# Patient Record
Sex: FEMALE | Race: WHITE
[De-identification: names, ages, dates, MRNs, and addresses within clinical notes are randomized per-mention and may not be internally consistent; named-entity substitution may affect disease eponyms.]

---

## 2020-10-08 PROBLEM — Z00.00 ENCOUNTER FOR PREVENTIVE HEALTH EXAMINATION: Status: ACTIVE | Noted: 2020-10-08

## 2020-10-22 ENCOUNTER — APPOINTMENT (OUTPATIENT)
Dept: THORACIC SURGERY | Facility: CLINIC | Age: 55
End: 2020-10-22
Payer: COMMERCIAL

## 2020-10-22 VITALS
BODY MASS INDEX: 25.61 KG/M2 | HEART RATE: 76 BPM | RESPIRATION RATE: 16 BRPM | WEIGHT: 150 LBS | SYSTOLIC BLOOD PRESSURE: 126 MMHG | HEIGHT: 64 IN | DIASTOLIC BLOOD PRESSURE: 78 MMHG | OXYGEN SATURATION: 97 %

## 2020-10-22 DIAGNOSIS — M47.819 SPONDYLOSIS W/OUT MYELOPATHY OR RADICULOPATHY, SITE UNSPECIFIED: ICD-10-CM

## 2020-10-22 DIAGNOSIS — Z80.1 FAMILY HISTORY OF MALIGNANT NEOPLASM OF TRACHEA, BRONCHUS AND LUNG: ICD-10-CM

## 2020-10-22 DIAGNOSIS — Z78.9 OTHER SPECIFIED HEALTH STATUS: ICD-10-CM

## 2020-10-22 DIAGNOSIS — M62.830 MUSCLE SPASM OF BACK: ICD-10-CM

## 2020-10-22 PROCEDURE — 99072 ADDL SUPL MATRL&STAF TM PHE: CPT

## 2020-10-22 PROCEDURE — 99245 OFF/OP CONSLTJ NEW/EST HI 55: CPT

## 2020-10-22 RX ORDER — IBUPROFEN 200 MG/1
TABLET, COATED ORAL
Refills: 0 | Status: ACTIVE | COMMUNITY

## 2020-10-22 NOTE — HISTORY OF PRESENT ILLNESS
[FreeTextEntry1] : Ms. CARDONA is a 55 year old female referred by Dr. Almonte who presents for consultation. Her past medical history includes cervical spine compression. \par \par \par Reports feeling numbness to her left fingers and left limb pain. She also experiencing occasional muscle spasms. She has been seen by Chiropractor, physical therapy and acupuncture for relief. this has been occurring over the past 6 months. She was evaluated by Neurology and Orthopedics and was referred for possible Thoracic Outlet Syndrome.

## 2020-10-22 NOTE — ASSESSMENT
[FreeTextEntry1] : We discussed the possibility of Neurogenic Thoracic Outlet Syndrome. Typically we would see this after sports related injury with impingement and compression of the arterial, nerve, venous systems at the thoracic outlet. \par Her condition could also be related to cervical spine degeneration as well. \par \par We discussed Robotic First Rib Resection for relief of Thoracic Outlet Syndrome. The procedure, hospital stay and recovery was discussed in detail. All risks, benefits, and alternatives discussed at length with patient including an overall <1% risk. All questions addressed.\par \par PLAN:\par - MRI for r/o Thoracic Outlet Syndrome (at [facility])\par - Return to care after imaging to discuss plan of care\par \par \par I, Aidan Turpin NP am scribing for and in the presence of Dr. Concepcion the following sections HISTORY OF PRESENT ILLNESS, PAST MEDICAL/FAMILY/SOCIAL HISTORY; REVIEW OF SYSTEMS; VITAL SIGNS; PHYSICAL EXAM; DISPOSITION.\par \par "I personally performed the services described in the documentation, reviewed the documentation recorded by the scribe in my presence and accurately and completely records my words and actions."\par

## 2020-10-22 NOTE — REVIEW OF SYSTEMS
[Limb Pain] : limb pain [Negative] : Heme/Lymph [Feeling Poorly] : not feeling poorly [Feeling Tired] : not feeling tired [FreeTextEntry9] : finger numbness

## 2020-10-22 NOTE — PHYSICAL EXAM
[General Appearance - In No Acute Distress] : in no acute distress [General Appearance - Well Nourished] : well nourished [General Appearance - Well Developed] : well developed [Sclera] : the sclera and conjunctiva were normal [Outer Ear] : the ears and nose were normal in appearance [Neck Appearance] : the appearance of the neck was normal [Jugular Venous Distention Increased] : there was no jugular-venous distention [Respiration, Rhythm And Depth] : normal respiratory rhythm and effort [Auscultation Breath Sounds / Voice Sounds] : lungs were clear to auscultation bilaterally [Heart Rate And Rhythm] : heart rate was normal and rhythm regular [Heart Sounds] : normal S1 and S2 [Examination Of The Chest] : the chest was normal in appearance [Chest Visual Inspection Thoracic Asymmetry] : no chest asymmetry [No Pulse Discrepancy] : no pulse discrepancy detected [Fingers] :  capillary refill of the fingers was normal [Abnormal Walk] : normal gait [Motor Tone] : muscle strength and tone were normal [Skin Color & Pigmentation] : normal skin color and pigmentation [Skin Lesions] : no skin lesions [Sensation] : the sensory exam was normal to light touch and pinprick [Motor Exam] : the motor exam was normal [Oriented To Time, Place, And Person] : oriented to person, place, and time [Impaired Insight] : insight and judgment were intact [Affect] : the affect was normal

## 2020-12-08 PROBLEM — M54.2 NECK PAIN ON LEFT SIDE: Status: ACTIVE | Noted: 2020-10-22

## 2020-12-08 NOTE — CONSULT LETTER
[Dear  ___] : Dear  [unfilled], [Courtesy Letter:] : I had the pleasure of seeing your patient, [unfilled], in my office today. [Please see my note below.] : Please see my note below. [Referral Closing:] : Thank you very much for seeing this patient.  If you have any questions, please do not hesitate to contact me. [Sincerely,] : Sincerely, [FreeTextEntry2] : Amaury Almonte MD\par [address] [facility]\[place] [address]\par P: [phone]\par F: [phone] [FreeTextEntry3] : Stuart Concepcion MD\par Director of Thoracic, [facility]\par Cardiovascular & Thoracic Surgery\par \par Cardiovascular & Thoracic Surgery\par [facility] [employer] [facility]\par \par [facility] \par [address] [place] [address]\[place] [address]\par [phone] Tel\par [phone] Fax\par

## 2020-12-08 NOTE — HISTORY OF PRESENT ILLNESS
[FreeTextEntry1] : Mrs. Granados is a 55 year old female here today for follow up MRI/MRA, thoracic outlet syndrome. Her past medical history includes cervical spine compression

## 2020-12-10 ENCOUNTER — APPOINTMENT (OUTPATIENT)
Dept: THORACIC SURGERY | Facility: CLINIC | Age: 55
End: 2020-12-10

## 2020-12-10 DIAGNOSIS — M54.2 CERVICALGIA: ICD-10-CM

## 2022-09-29 ENCOUNTER — APPOINTMENT (OUTPATIENT)
Dept: GASTROENTEROLOGY | Facility: CLINIC | Age: 57
End: 2022-09-29

## 2022-12-17 ENCOUNTER — NON-APPOINTMENT (OUTPATIENT)
Age: 57
End: 2022-12-17

## 2023-06-29 ENCOUNTER — APPOINTMENT (OUTPATIENT)
Dept: ORTHOPEDIC SURGERY | Facility: CLINIC | Age: 58
End: 2023-06-29
Payer: COMMERCIAL

## 2023-06-29 VITALS — WEIGHT: 150 LBS | HEIGHT: 64 IN | BODY MASS INDEX: 25.61 KG/M2

## 2023-06-29 PROCEDURE — 73030 X-RAY EXAM OF SHOULDER: CPT | Mod: RT

## 2023-06-29 PROCEDURE — 73070 X-RAY EXAM OF ELBOW: CPT | Mod: RT

## 2023-06-29 PROCEDURE — 99204 OFFICE O/P NEW MOD 45 MIN: CPT

## 2023-06-29 RX ORDER — DICLOFENAC SODIUM 1% 10 MG/G
1 GEL TOPICAL
Qty: 1 | Refills: 0 | Status: ACTIVE | COMMUNITY
Start: 2023-06-29 | End: 1900-01-01

## 2023-06-29 RX ORDER — MELOXICAM 15 MG/1
15 TABLET ORAL
Qty: 90 | Refills: 0 | Status: ACTIVE | COMMUNITY
Start: 2023-06-29 | End: 1900-01-01

## 2023-06-29 NOTE — DISCUSSION/SUMMARY
[de-identified] : (Assessment and Plan)\par \par History, clinical examination and imaging were most consistent with:\par -right shoulder rotator cuff partial tear and proximal biceps tendonitis\par -right elbow lateral epicondylitis\par \par The diagnosis was explained in detail. The potential non-surgical and surgical treatments were reviewed. The relative risks and benefits of each option were considered relative to the patient’s age, activity level, medical history, symptom severity and previously attempted treatments. \par \par The patient was advised to consult with their primary medical provider prior to initiation of any new medications to reduce the risk of adverse effects specific to their long-term home medications and medical history. The risk of gastrointestinal irritation and kidney injury specific to long-term NSAID use was discussed.   \par \par \par -Patient will proceed with formal PT.\par -Patient will begin bracing with counterforce strap and removable wrist brace for the elbow. \par -We discussed the prolonged natural history of her elbow condition.\par -Injection is discussed and defererd.\par -Meloxicam and voltaren gel as needed for pain. \par -The added clinical utility of an MRI was discussed. The patient deferred further diagnostic testing at this time.\par -Follow up in 6 weeks. If symptoms persist consider injection. \par \par \par (MDM) \par \par Problem Complexity\par -Moderate: chronic illness with exacerbation\par \par Risk\par -Moderate: prescription medication\par \par -Patient has not been seen by another provider in my practice within the past 2 years who specializes in orthopedic sports medicine, shoulder or elbow surgery. \par \par

## 2023-06-29 NOTE — HISTORY OF PRESENT ILLNESS
[de-identified] : (History of Present Illness)\par \par Patient age in years is 57\par Occupation is \par \par Body part causing symptoms is the rt shoulder and rt elbow\par Symptoms began 1 month ago, no injury, associated with working out\par Location of pain is anterior shoulder and lateral elbow\par Quality of pain is  dull\par Pain score at rest is 0\par Pain score during activity is 5\par Radicular symptoms are not present\par Prior treatments include advil, ice\par Patient's condition is not associated with worker’s compensation, no-fault or interscholastic athletics\par

## 2023-06-29 NOTE — IMAGING
[de-identified] : (Physical Exam: Shoulder)\par \par Laterality is right \par \par Patient is in no acute distress, alert and oriented\par Sensation is grossly intact to light touch in the hand\par Motor function is 5/5 in the hand\par Capillary refill is less than 2 seconds in the fingers\par Lymphadenopathy is not present\par Peripheral edema is not present\par \par Skin is intact \par Swelling is not present \par Atrophy is not present\par Scapular winging is not present\par Deformity of the AC joint is not present\par Deformity of the biceps is not present \par \par Bicipital groove tenderness is present \par AC joint tenderness is not present \par Scapulothoracic tenderness is not present \par Cervical paraspinal tenderness is not present \par \par Active forward elevation is 175\par Passive forward elevation is 175\par External rotation at the side is 80\par Internal rotation behind the back is to the level of T7 \par \par Forward elevation strength is 5/5\par External rotation strength at the side is 5/5 \par Internal rotation strength at the side is 5/5 \par Deltoid strength of anterior, posterior and lateral heads is 5/5\par \par Starks test is abnormal \par O’Allen’s test is abnormal\par Speed’s test is normal\par Cross body adduction test is normal\par Apprehension and relocation is normal\par Sulcus sign is normal\par Belly press test is normal\par Spurling’s test is normal\par \par (Physical Exam: Elbow)\par \par Laterality is right \par \par Skin is intact \par Olecranon bursa swelling is not present\par Biceps deformity is not present\par Intrinsic atrophy is not present\par \par Lateral epicondyle tenderness is present \par Medial epicondyle tenderness is not present \par Radial head tenderness is not present\par Biceps tenderness is not present \par Triceps tenderness is not present \par \par Extension is 0\par Flexion is 140\par Pronation is 80\par Supination is 80\par \par Flexion strength is 5/5\par Extension strength is 5/5 \par Pronation strength is 5/5 \par Supination strength is 5/5 \par \par Cozen's test is abnormal\par Biceps hook test is normal \par Tinel's test of ulnar nerve is normal\par Moving valgus stress test is normal\par \par  [Right] : right elbow [There are no fractures, subluxations or dislocations. No significant abnormalities are seen] : There are no fractures, subluxations or dislocations. No significant abnormalities are seen

## 2023-07-18 ENCOUNTER — TRANSCRIPTION ENCOUNTER (OUTPATIENT)
Age: 58
End: 2023-07-18

## 2023-08-24 ENCOUNTER — APPOINTMENT (OUTPATIENT)
Dept: ORTHOPEDIC SURGERY | Facility: CLINIC | Age: 58
End: 2023-08-24

## 2023-10-22 ENCOUNTER — NON-APPOINTMENT (OUTPATIENT)
Age: 58
End: 2023-10-22

## 2023-10-26 ENCOUNTER — APPOINTMENT (OUTPATIENT)
Dept: ORTHOPEDIC SURGERY | Facility: CLINIC | Age: 58
End: 2023-10-26
Payer: COMMERCIAL

## 2023-10-26 VITALS — HEIGHT: 64 IN | BODY MASS INDEX: 25.61 KG/M2 | WEIGHT: 150 LBS

## 2023-10-26 DIAGNOSIS — Z78.9 OTHER SPECIFIED HEALTH STATUS: ICD-10-CM

## 2023-10-26 DIAGNOSIS — G56.21 LESION OF ULNAR NERVE, RIGHT UPPER LIMB: ICD-10-CM

## 2023-10-26 DIAGNOSIS — Z86.2 PERSONAL HISTORY OF DISEASES OF THE BLOOD AND BLOOD-FORMING ORGANS AND CERTAIN DISORDERS INVOLVING THE IMMUNE MECHANISM: ICD-10-CM

## 2023-10-26 PROCEDURE — 99214 OFFICE O/P EST MOD 30 MIN: CPT | Mod: 25

## 2023-10-26 PROCEDURE — 20610 DRAIN/INJ JOINT/BURSA W/O US: CPT | Mod: RT

## 2023-10-31 ENCOUNTER — APPOINTMENT (OUTPATIENT)
Dept: ORTHOPEDIC SURGERY | Facility: CLINIC | Age: 58
End: 2023-10-31

## 2024-01-11 ENCOUNTER — APPOINTMENT (OUTPATIENT)
Dept: ORTHOPEDIC SURGERY | Facility: CLINIC | Age: 59
End: 2024-01-11

## 2024-03-07 ENCOUNTER — APPOINTMENT (OUTPATIENT)
Dept: ORTHOPEDIC SURGERY | Facility: CLINIC | Age: 59
End: 2024-03-07
Payer: COMMERCIAL

## 2024-03-07 VITALS — HEIGHT: 64 IN | BODY MASS INDEX: 25.61 KG/M2 | WEIGHT: 150 LBS

## 2024-03-07 DIAGNOSIS — M54.2 CERVICALGIA: ICD-10-CM

## 2024-03-07 PROCEDURE — 73010 X-RAY EXAM OF SHOULDER BLADE: CPT | Mod: RT

## 2024-03-07 PROCEDURE — 73030 X-RAY EXAM OF SHOULDER: CPT | Mod: RT

## 2024-03-07 PROCEDURE — 99214 OFFICE O/P EST MOD 30 MIN: CPT

## 2024-03-07 RX ORDER — OMEPRAZOLE 40 MG/1
CAPSULE, DELAYED RELEASE ORAL
Refills: 0 | Status: ACTIVE | COMMUNITY

## 2024-03-07 RX ORDER — DIAZEPAM 5 MG/1
5 TABLET ORAL
Qty: 1 | Refills: 0 | Status: ACTIVE | COMMUNITY
Start: 2024-03-07 | End: 1900-01-01

## 2024-03-08 NOTE — DATA REVIEWED
[MRI] : MRI [Right] : of the right [Report was reviewed and noted in the chart] : The report was reviewed and noted in the chart [Shoulder] : shoulder [I independently reviewed and interpreted images and report] : I independently reviewed and interpreted images and report [I reviewed the films/CD and additionally noted] : I reviewed the films/CD and additionally noted [FreeTextEntry1] : partial to near full-thickness supraspinatus tendon tear, superior third subscapularis tendon tear with subluxation of the biceps tendon.

## 2024-03-08 NOTE — IMAGING
[de-identified] : The patient is a well appearing 58 year  old female of their stated age.  Neck is supple & nontender to palpation. POSITIVE SPURLING'S ON THE RIGHT.    Effected Shoulder: RIGHT  Inspection:  Scapula Winging: Negative  Deformity: None  Erythema: None  Ecchymosis: None  Abrasions: None  Effusion: None   Range of Motion:  Active Forward Flexion: 170 degrees   Active Abduction: 170 degrees   Passive Forward Flexion: 170 degrees   Passive Abduction: 170 degrees   ER @ 90 degrees: 90 degrees  IR @ 90 degrees: 35 degrees  ER @ 0 degrees: 35 degrees  Motor Exam:  Forward Flexion: 4- out of 5  Flexion Plane of Scapula: 4- out of 5  Abduction: 4- out of 5  Internal Rotation: 4 out of 5  External Rotation: 4+ out of 5  Distal Motor Strength: 5 out of 5   Stability Testing:  Anterior: 1+  Posterior: 1+  Sulcus N: 1+  Sulcus ER: 1+  Provocative Tests:  Drop Arm: Negative  Impingement: POSITIVE  Essex: Negative  X-Arm Adduction: Negative  Belly Press: POSITIVE  Bear Hug: POSITIVE  Lift Off: Negative  Apprehension: Negative  Relocation: Negative  Posterior Load & Shift: Negative   Palpation:  AC Joint: Nontender  Clavicle: Nontender  SC Joint: Nontender  Bicepital Groove: Nontender  Coracoid Process: Nontender  Pectoralis Minor Tendon: Nontender  Pectoralis Major Tendon: Nontender & palpably intact  Latissimus Dorsi: Nontender   Proximal Humerus: Nontender  Scapula Body: Nontender  Medial Scapula Boarder: Nontender  Scapula Spine: Nontender   Neurologic Exam: Sensation to Light Touch:  Axillary: Grossly intact  Ulnar: Grossly intact  Radial: Grossly intact  Median: Grossly intact  Other:  N/A  Circulatory/Pulses:  Ulnar: 2+  Radial: 2+  Other Pertinent Findings: + Tinel's at the elbow   Contralateral Shoulder  Range of Motion:  Active Forward Flexion: 180 degrees   Active Abduction: 180 degrees   Passive Forward Flexion: 180 degrees   Passive Abduction: 180 degrees   ER @ 90 degrees: 90 degrees  IR @ 90 degrees: 45 degrees  ER @ 0 degrees: 50 degrees  Motor Exam:  Forward Flexion: 5 out of 5  Flexion Plane of Scapula: 5 out of 5  Abduction: 5 out of 5  Internal Rotation: 5 out of 5  External Rotation: 5 out of 5  Distal Motor Strength: 5 out of 5  Stability Testing:  Anterior: 1+  Posterior: 1+  Sulcus N: 1+  Sulcus ER: 1+  Other Pertinent Findings: None   Assessment: The patient is a 58 year old female with right shoulder pain and radiographic and physical exam findings consistent with rotator cuff tear and possible HNP.    The patients condition is acute  Documents/Results Reviewed Today: MRI right shoulder  Tests/Studies Independently Interpreted Today: MRI right shoulder reveals partial to near full-thickness supraspinatus tendon tear, superior third subscapularis tendon tear with subluxation of the biceps tendon.  Pertinent findings include: 170/170/90/35/35, 4-/5 ABD/FSP/FF, 4/5 IR, 4+/5 ER, +impingement, +bear hug, +belly press, +Spurlings, +Tinel's   Confounding medical conditions/concerns: None  Plan: Due to worsening pain and instability with radicular symptoms, patient will obtain MRI cervical spine to evaluate for disc herniation prior to proceeding with surgical intervention. Prescribed patient one Valium to take prior to obtaining MRI. Advised patient the risks involved with taking prescription medications - patient expresses understanding and reports they will be compliant. In the meantime, take OTC antiinflammatories as needed - use as directed. Modify activity as discussed.  Tests Ordered: MRI cervical spine  Prescription Medications Ordered: One Valium Discussed appropriate use of OTC anti-inflammatories and topical analgesics (including but not limited to Aleve, Advil, Tylenol, Motrin, Ibuprofen, Voltaren gel, etc.)  Braces/DME Ordered: None  Activity/Work/Sports Status: None  Additional Instructions: None Follow-Up: after MRI   The patient's current medication management of their orthopedic diagnosis was reviewed today: (1) We discussed a comprehensive treatment plan that included possible pharmaceutical management involving the use of prescription strength medications including but not limited to options such as oral Naprosyn 500mg BID, once daily Meloxicam 15 mg, or 500-650 mg Tylenol versus over the counter oral medications and topical prescription NSAID Pennsaid vs over the counter Voltaren gel.  Based on our extensive discussion, the patient declined prescription medication and will use over the counter Advil, Alleve, Voltaren Gel or Tylenol as directed. (2) There is a moderate risk of morbidity with further treatment, especially from use of prescription strength medications and possible side effects of these medications which include upset stomach with oral medications, skin reactions to topical medications and cardiac/renal issues with long term use. (3) I recommended that the patient follow-up with their medical physician to discuss any significant specific potential issues with long term medication use such as interactions with current medications or with exacerbation of underlying medical comorbidities. (4) The benefits and risks associated with use of injectable, oral or topical, prescription and over the counter anti-inflammatory medications were discussed with the patient. The patient voiced understanding of the risks including but not limited to bleeding, stroke, kidney dysfunction, heart disease, and were referred to the black box warning label for further information.  I, Evon Arenas, attest that this documentation has been prepared under the direction and in the presence of Provider Dr. Nish Moody.   The documentation recorded by the scribe accurately reflects the services IDr. Nish, personally performed and the decisions made by me.

## 2024-03-08 NOTE — HISTORY OF PRESENT ILLNESS
[de-identified] : The patient is a 58 year  old right hand dominant female who presents today complaining of right shoulder pain.   Date of Injury/Onset: 3/1/23 Pain:    At Rest: 2/10 10/10 - sleeping With Activity:  6/10  Mechanism of injury: gradual onset, no MARCOS This is NOT a Work Related Injury being treated under Worker's Compensation. This is NOT an athletic injury occurring associated with an interscholastic or organized sports team. Quality of symptoms: aching, sharp, stabbing, numbness and tingling into pinky and ring finger Improves with: rest, ice Worse with: sleeping, turning over in bed, external rotation, lifting with internal rotation Prior treatment: Dr. Chong, PT, chiropractor, acupuncture, massage therapy Prior Imaging: xray, MRI Out of work/sport: currently working School/Sport/Position/Occupation:   Additional Information: Fell in the shower due to a vertigo episode on 2/20 and landed on her right shoulder and her pain has been worse since the fall.

## 2024-03-09 ENCOUNTER — RESULT REVIEW (OUTPATIENT)
Age: 59
End: 2024-03-09

## 2024-03-14 ENCOUNTER — APPOINTMENT (OUTPATIENT)
Dept: ORTHOPEDIC SURGERY | Facility: CLINIC | Age: 59
End: 2024-03-14
Payer: COMMERCIAL

## 2024-03-14 VITALS — WEIGHT: 150 LBS | BODY MASS INDEX: 25.61 KG/M2 | HEIGHT: 64 IN

## 2024-03-14 DIAGNOSIS — M50.20 OTHER CERVICAL DISC DISPLACEMENT, UNSPECIFIED CERVICAL REGION: ICD-10-CM

## 2024-03-14 PROCEDURE — 99214 OFFICE O/P EST MOD 30 MIN: CPT | Mod: 25

## 2024-03-14 PROCEDURE — L3670: CPT | Mod: RT

## 2024-03-14 NOTE — HISTORY OF PRESENT ILLNESS
[de-identified] : The patient is a 58 year  old right hand dominant female who presents today complaining of right shoulder pain.   Date of Injury/Onset: 3/1/23 Re-injury: 2/20/24 Pain:    At Rest: 2/10 10/10 - sleeping With Activity:  6/10  Mechanism of injury: gradual onset, no MARCOS This is NOT a Work Related Injury being treated under Worker's Compensation. This is NOT an athletic injury occurring associated with an interscholastic or organized sports team. Quality of symptoms: aching, sharp, stabbing, numbness and tingling into pinky and ring finger Improves with: rest, ice Worse with: sleeping, turning over in bed, external rotation, lifting with internal rotation Treatment/Imaging/Studies Since Last Visit: MRI 	Reports Available For Review Today: MRI report Out of work/sport: currently working School/Sport/Position/Occupation:   Changes since last visit: patient reports that she is feeling a little better since modifying her activity. Here to review MRI results Additional Information: none

## 2024-03-14 NOTE — DATA REVIEWED
[Cervical Spine] : cervical spine [MRI] : MRI [Right] : of the right [Shoulder] : shoulder [Report was reviewed and noted in the chart] : The report was reviewed and noted in the chart [I independently reviewed and interpreted images and report] : I independently reviewed and interpreted images and report [I reviewed the films/CD and additionally noted] : I reviewed the films/CD and additionally noted [FreeTextEntry1] : C6-C7 disc herniation with left>right foraminal stenosis, C5-C6 disc herniation with left sided foraminal stenosis, straightening of the normal lordotic curve consistent with spasms. [FreeTextEntry2] :  partial to near full-thickness supraspinatus tendon tear, superior third subscapularis tendon tear with subluxation of the biceps tendon.

## 2024-03-14 NOTE — IMAGING
[de-identified] : The patient is a well appearing 58 year  old female of their stated age.  Neck is supple & nontender to palpation. POSITIVE SPURLING'S ON THE RIGHT.    Effected Shoulder: RIGHT  Inspection:  Scapula Winging: Negative  Deformity: None  Erythema: None  Ecchymosis: None  Abrasions: None  Effusion: None   Range of Motion:  Active Forward Flexion: 170 degrees   Active Abduction: 170 degrees   Passive Forward Flexion: 170 degrees   Passive Abduction: 170 degrees   ER @ 90 degrees: 90 degrees  IR @ 90 degrees: 35 degrees  ER @ 0 degrees: 35 degrees  Motor Exam:  Forward Flexion: 4- out of 5  Flexion Plane of Scapula: 4- out of 5  Abduction: 4- out of 5  Internal Rotation: 4 out of 5  External Rotation: 4+ out of 5  Distal Motor Strength: 5 out of 5   Stability Testing:  Anterior: 1+  Posterior: 1+  Sulcus N: 1+  Sulcus ER: 1+  Provocative Tests:  Drop Arm: Negative  Impingement: POSITIVE  [place]: Negative  X-Arm Adduction: Negative  Belly Press: POSITIVE  Bear Hug: POSITIVE  Lift Off: Negative  Apprehension: Negative  Relocation: Negative  Posterior Load & Shift: Negative   Palpation:  AC Joint: Nontender  Clavicle: Nontender  SC Joint: Nontender  Bicepital Groove: Nontender  Coracoid Process: Nontender  Pectoralis Minor Tendon: Nontender  Pectoralis Major Tendon: Nontender & palpably intact  Latissimus Dorsi: Nontender   Proximal Humerus: Nontender  Scapula Body: Nontender  Medial Scapula Boarder: Nontender  Scapula Spine: Nontender   Neurologic Exam: Sensation to Light Touch:  Axillary: Grossly intact  Ulnar: Grossly intact  Radial: Grossly intact  Median: Grossly intact  Other:  N/A  Circulatory/Pulses:  Ulnar: 2+  Radial: 2+  Other Pertinent Findings: + Tinel's at the elbow   Contralateral Shoulder  Range of Motion:  Active Forward Flexion: 180 degrees   Active Abduction: 180 degrees   Passive Forward Flexion: 180 degrees   Passive Abduction: 180 degrees   ER @ 90 degrees: 90 degrees  IR @ 90 degrees: 45 degrees  ER @ 0 degrees: 50 degrees  Motor Exam:  Forward Flexion: 5 out of 5  Flexion Plane of Scapula: 5 out of 5  Abduction: 5 out of 5  Internal Rotation: 5 out of 5  External Rotation: 5 out of 5  Distal Motor Strength: 5 out of 5  Stability Testing:  Anterior: 1+  Posterior: 1+  Sulcus N: 1+  Sulcus ER: 1+  Other Pertinent Findings: None   Assessment: The patient is a 58 year old female with right shoulder pain and radiographic and physical exam findings consistent with rotator cuff tear and cervical HNP.    The patients condition is acute  Documents/Results Reviewed Today: MRI cervical spine and Re-reviewed MRI right shoulder  Tests/Studies Independently Interpreted Today: MRI cervical spine reveals C6-C7 disc herniation with left>right foraminal stenosis, C5-C6 disc herniation with left sided foraminal stenosis, straightening of the normal lordotic curve consistent with spasms. MRI right shoulder reveals partial to near full-thickness supraspinatus tendon tear, superior third subscapularis tendon tear with subluxation of the biceps tendon. Pertinent findings include: 170/170/90/35/35, 4-/5 ABD/FSP/FF, 4/5 IR, 4+/5 ER, +impingement, +bear hug, +belly press, +Spurlings, +Tinel's   Confounding medical conditions/concerns: None  Plan: Discussed non-operative and operative treatment options including right shoulder arthroscopic rotator cuff repair, biceps tenotomy versus tenodesis, subacromial decompression, acromioplasty, and any indicated procedures. All questions and concerns regarding the surgery were addressed. Went over the recovery timeline and expected outcomes following surgery. Patient elected to move forward with the surgical procedure. In the meantime, referred patient to pain management specialists for further evaluation and discussion of treatment options of her cervical spine.  Tests Ordered: Pre-op  Prescription Medications Ordered: One Valium Discussed appropriate use of OTC anti-inflammatories and topical analgesics (including but not limited to Aleve, Advil, Tylenol, Motrin, Ibuprofen, Voltaren gel, etc.)  Braces/DME Ordered: Ultrasling and cold therapy unit  Activity/Work/Sports Status: None  Additional Instructions: None Follow-Up: 2 weeks post-op   The patient's current medication management of their orthopedic diagnosis was reviewed today: (1) We discussed a comprehensive treatment plan that included possible pharmaceutical management involving the use of prescription strength medications including but not limited to options such as oral Naprosyn 500mg BID, once daily Meloxicam 15 mg, or 500-650 mg Tylenol versus over the counter oral medications and topical prescription NSAID Pennsaid vs over the counter Voltaren gel.  Based on our extensive discussion, the patient declined prescription medication and will use over the counter Advil, Alleve, Voltaren Gel or Tylenol as directed. (2) There is a moderate risk of morbidity with further treatment, especially from use of prescription strength medications and possible side effects of these medications which include upset stomach with oral medications, skin reactions to topical medications and cardiac/renal issues with long term use. (3) I recommended that the patient follow-up with their medical physician to discuss any significant specific potential issues with long term medication use such as interactions with current medications or with exacerbation of underlying medical comorbidities. (4) The benefits and risks associated with use of injectable, oral or topical, prescription and over the counter anti-inflammatory medications were discussed with the patient. The patient voiced understanding of the risks including but not limited to bleeding, stroke, kidney dysfunction, heart disease, and were referred to the black box warning label for further information.  Consent:  Conservative treatment, nontreatment, nonsurgical intervention and surgical intervention treatment options have been reviewed with the patient.  The patient continues to be symptomatic and has failed conservative treatment, and elects to move forward with surgical intervention.  The patient is indicated for right shoulder arthroscopic rotator cuff repair, biceps tenotomy vs tenodesis, subacromial decompression, acromioplasty and all indicated procedures. As such the alternatives, benefits and risks, of the above procedure, including but not limited to bleeding, infection, neurovascular injury, loss of limb, loss of life,  DVT, PE, RSD, inability to return to previous level of activity, inability to return to previous level of employment, advancement of or to osteoarthritic changes, joint instability or motion loss, hardware failure or migration, (biceps pain cramping weakness or deformity,) failure to resolve all symptoms, failure to return to sports and need for further procedures, as well as specific risk of need for future joint arthroplasty were discussed with the patient and/or their legal guardian who agreed to move forward with surgical intervention.  They have reviewed and signed the consent form today after expressing understanding of the above documented conversation. The patient or their representative will contact my office as instructed on the preoperative instruction sheet they received today to schedule surgery in a timely manner as discussed. Over 25 minutes were spent on this encounter including time with the patient and over 15 minutes spent on counseling and coordination of care.   I, Evon Arenas, attest that this documentation has been prepared under the direction and in the presence of Provider Dr. Nish Moody.  The documentation recorded by the scribe accurately reflects the services I, Dr. Nish Moody, personally performed and the decisions made by me.

## 2024-04-15 ENCOUNTER — APPOINTMENT (OUTPATIENT)
Dept: ORTHOPEDIC SURGERY | Facility: CLINIC | Age: 59
End: 2024-04-15
Payer: COMMERCIAL

## 2024-04-15 VITALS — HEIGHT: 64 IN | BODY MASS INDEX: 25.61 KG/M2 | WEIGHT: 150 LBS

## 2024-04-15 PROCEDURE — 99204 OFFICE O/P NEW MOD 45 MIN: CPT

## 2024-04-17 NOTE — PHYSICAL EXAM
[de-identified] : General: Well appearing, no acute distress Neurologic: A&Ox3, No focal deficits Head: NCAT without abrasions, lacerations, or ecchymosis to head, face, or scalp Eyes: No scleral icterus, no gross abnormalities Respiratory: Equal chest wall expansion bilaterally, no accessory muscle use Lymphatic: No lymphadenopathy palpated Skin: Warm and dry Psychiatric: Normal mood and affect   Right Shoulder:  Inspection/Palpation: biceps groove tenderness  Range of Motion: no crepitus; FF 0-150 with pain; ER at side 45; IR to T7  Strength: forward elevation in scapular plane [5]/5, internal rotation [5]/5, external rotation [5]/5, adduction [5]/5 and abduction [5]/5  Stability: load and shift test negative, apprehension test negative, relocation test negative, sulcus sign negative, Gretchen test negative, Jerk test negative  Tests: Starks negative, Neer's test's test negative, drop arm test negative, bear hug test negative, [place] sign negative, cross arm adduction negative, lift off sign negative, Hornblower's sign negative, speeds test negative, Yergason's test negative, bicipital groove tenderness POS, Templeton's Active Compression test negative, whipple test negative, bicep's load II test negative [de-identified] : MRI Right Shoulder JaquelineErwin  CEBALLOS: 10/19/23  IMPRESSION:  Suprapinatus tendinosis with intrasubstance delaminating tearing at its insertion.   Mild fraying/ truncation at the free edge posterior superior labrum.   Mild degenerative changes of the acromioclavicular joint.   Evaluation limited by early termination at the patient's request.

## 2024-04-17 NOTE — DISCUSSION/SUMMARY
[de-identified] : We had a thorough discussion regarding the nature of her pain, the pathophysiology, as well as all treatment options. Based on clinical exam, MRI and radiograph findings they have low-grade partial tearing of the right rotator cuff. The patient came to me for a third opinion. I discussed operative and non-operative treatment modalities and the benefits of both. The patient would like to consider non-operative care (although it is incomplete). She could also consider a repeat cortisone injection in the future, although the prior injection gave her minimal relief. I believe she has failed greater than 6mo of conservative care and is indicated for surgery at this time. She would like to think about the procedure further. She will f/u with Dr. Moody for further discussion. All questions were answered and the patient verbalized understanding. The patient is in agreement with this treatment plan.

## 2024-04-17 NOTE — CONSULT LETTER
[Dear  ___] : Dear  [unfilled], [Consult Letter:] : I had the pleasure of evaluating your patient, [unfilled]. [Please see my note below.] : Please see my note below. [Sincerely,] : Sincerely, [FreeTextEntry3] : Dr. Kenton Mcintosh

## 2024-04-17 NOTE — HISTORY OF PRESENT ILLNESS
[de-identified] : SHAYLEE CARDONA is a 58 year female being seen for initial visit R shoulder pain. She has been under the care of Dr. Chong and Dr. Moody.  Dr. Chong ordered an MRI of her shoulder dictated in imaging. On 10/26/2023 she received a right shoulder subacromial cortisone injection for right shoulder RTC tendinitis/tear. She then saw Dr. Moody on 3/7/2024 who ordered a cervical spine MRI to rule out cervical disc pathology. She returned to see Dr. Moody on 3/14/2024 who scheduled her for surgery. She reports pain with PT, which she attended for about a month.

## 2024-04-17 NOTE — ADDENDUM
[FreeTextEntry1] : Documented by Kiersten Raymond acting as a scribe for Dr. Mcintosh on 04/15/2024. All medical record entries made by the Scribe were at my, Dr. Mcintosh's, direction and personally dictated by me on 04/15/2024. I have reviewed the chart and agree that the record accurately reflects my personal performance of the history, physical exam, procedure and imaging.

## 2024-05-02 ENCOUNTER — APPOINTMENT (OUTPATIENT)
Dept: ORTHOPEDIC SURGERY | Facility: CLINIC | Age: 59
End: 2024-05-02
Payer: COMMERCIAL

## 2024-05-02 VITALS — WEIGHT: 150 LBS | BODY MASS INDEX: 25.61 KG/M2 | HEIGHT: 64 IN

## 2024-05-02 DIAGNOSIS — M75.111 INCOMPLETE ROTATOR CUFF TEAR OR RUPTURE OF RIGHT SHOULDER, NOT SPECIFIED AS TRAUMATIC: ICD-10-CM

## 2024-05-02 DIAGNOSIS — M54.12 RADICULOPATHY, CERVICAL REGION: ICD-10-CM

## 2024-05-02 DIAGNOSIS — M75.21 BICIPITAL TENDINITIS, RIGHT SHOULDER: ICD-10-CM

## 2024-05-02 DIAGNOSIS — M25.511 PAIN IN RIGHT SHOULDER: ICD-10-CM

## 2024-05-02 DIAGNOSIS — S46.811A STRAIN OF OTHER MUSCLES, FASCIA AND TENDONS AT SHOULDER AND UPPER ARM LEVEL, RIGHT ARM, INITIAL ENCOUNTER: ICD-10-CM

## 2024-05-02 DIAGNOSIS — M75.101 UNSPECIFIED ROTATOR CUFF TEAR OR RUPTURE OF RIGHT SHOULDER, NOT SPECIFIED AS TRAUMATIC: ICD-10-CM

## 2024-05-02 PROCEDURE — 99213 OFFICE O/P EST LOW 20 MIN: CPT

## 2024-05-03 NOTE — HISTORY OF PRESENT ILLNESS
[de-identified] : The patient is a 58 year  old right hand dominant female who presents today complaining of right shoulder pain.   Date of Injury/Onset: 3/1/23 Re-injury: 2/20/24 Pain:    At Rest: 3/10 - sleeping With Activity:  6/10  Mechanism of injury: gradual onset, no MARCOS This is NOT a Work Related Injury being treated under Worker's Compensation. This is NOT an athletic injury occurring associated with an interscholastic or organized sports team. Quality of symptoms: aching, sharp, stabbing, numbness and tingling into pinky and ring finger Improves with: rest, ice Worse with: sleeping, turning over in bed, external rotation, lifting with internal rotation Treatment/Imaging/Studies Since Last Visit: HEP/working out  	Reports Available For Review Today: None new  Out of work/sport: currently working School/Sport/Position/Occupation:   Changes since last visit: Patient is doing a HEP  Additional Information: Patient was indicated for a right shoulder RCR, but cancelled it because she is not ready to undergo surgery yet

## 2024-05-03 NOTE — IMAGING
[de-identified] : The patient is a well appearing 58 year  old female of their stated age.  Neck is supple & nontender to palpation. POSITIVE SPURLING'S ON THE RIGHT.    Effected Shoulder: RIGHT  Inspection:  Scapula Winging: Negative  Deformity: None  Erythema: None  Ecchymosis: None  Abrasions: None  Effusion: None   Range of Motion:  Active Forward Flexion: 170 degrees   Active Abduction: 170 degrees   Passive Forward Flexion: 170 degrees   Passive Abduction: 170 degrees   ER @ 90 degrees: 90 degrees  IR @ 90 degrees: 35 degrees  ER @ 0 degrees: 35 degrees  Motor Exam:  Forward Flexion: 4- out of 5  Flexion Plane of Scapula: 4- out of 5  Abduction: 4- out of 5  Internal Rotation: 4 out of 5  External Rotation: 4+ out of 5  Distal Motor Strength: 5 out of 5   Stability Testing:  Anterior: 1+  Posterior: 1+  Sulcus N: 1+  Sulcus ER: 1+  Provocative Tests:  Drop Arm: Negative  Impingement: POSITIVE  [place]: Negative  X-Arm Adduction: Negative  Belly Press: POSITIVE  Bear Hug: POSITIVE  Lift Off: Negative  Apprehension: Negative  Relocation: Negative  Posterior Load & Shift: Negative   Palpation:  AC Joint: Nontender  Clavicle: Nontender  SC Joint: Nontender  Bicepital Groove: Nontender  Coracoid Process: Nontender  Pectoralis Minor Tendon: Nontender  Pectoralis Major Tendon: Nontender & palpably intact  Latissimus Dorsi: Nontender   Proximal Humerus: Nontender  Scapula Body: Nontender  Medial Scapula Boarder: Nontender  Scapula Spine: Nontender   Neurologic Exam: Sensation to Light Touch:  Axillary: Grossly intact  Ulnar: Grossly intact  Radial: Grossly intact  Median: Grossly intact  Other:  N/A  Circulatory/Pulses:  Ulnar: 2+  Radial: 2+  Other Pertinent Findings: + Tinel's at the elbow   Contralateral Shoulder  Range of Motion:  Active Forward Flexion: 180 degrees   Active Abduction: 180 degrees   Passive Forward Flexion: 180 degrees   Passive Abduction: 180 degrees   ER @ 90 degrees: 90 degrees  IR @ 90 degrees: 45 degrees  ER @ 0 degrees: 50 degrees  Motor Exam:  Forward Flexion: 5 out of 5  Flexion Plane of Scapula: 5 out of 5  Abduction: 5 out of 5  Internal Rotation: 5 out of 5  External Rotation: 5 out of 5  Distal Motor Strength: 5 out of 5  Stability Testing:  Anterior: 1+  Posterior: 1+  Sulcus N: 1+  Sulcus ER: 1+  Other Pertinent Findings: None   Assessment: The patient is a 58 year old female with right shoulder pain and radiographic and physical exam findings consistent with rotator cuff tear and cervical HNP.    The patients condition is acute  Documents/Results Reviewed Today: None   Tests/Studies Independently Interpreted Today: None  Pertinent findings include: 170/170/90/35/35, 4-/5 ABD/FSP/FF, 4/5 IR, 4+/5 ER, +impingement, +bear hug, +belly press, +Spurlings, +Tinel's   Confounding medical conditions/concerns: None  Plan: The patent has deferred surgical management secondary to personal life complications. Again, we discussed non-operative and operative treatment options including right shoulder arthroscopic rotator cuff repair, biceps tenotomy versus tenodesis, subacromial decompression, acromioplasty, and any indicated procedures. All questions and concerns regarding the surgery were addressed. Went over the recovery timeline and expected outcomes following surgery. Patient elected to move forward with the surgical procedure. She will call for surgical scheduling .  Tests Ordered: Pre-op  Prescription Medications Ordered: One Valium Discussed appropriate use of OTC anti-inflammatories and topical analgesics (including but not limited to Aleve, Advil, Tylenol, Motrin, Ibuprofen, Voltaren gel, etc.)  Braces/DME Ordered: Ultrasling and cold therapy unit  Activity/Work/Sports Status: None  Additional Instructions: None Follow-Up: 2 weeks post-op   The patient's current medication management of their orthopedic diagnosis was reviewed today: (1) We discussed a comprehensive treatment plan that included possible pharmaceutical management involving the use of prescription strength medications including but not limited to options such as oral Naprosyn 500mg BID, once daily Meloxicam 15 mg, or 500-650 mg Tylenol versus over the counter oral medications and topical prescription NSAID Pennsaid vs over the counter Voltaren gel.  Based on our extensive discussion, the patient declined prescription medication and will use over the counter Advil, Alleve, Voltaren Gel or Tylenol as directed. (2) There is a moderate risk of morbidity with further treatment, especially from use of prescription strength medications and possible side effects of these medications which include upset stomach with oral medications, skin reactions to topical medications and cardiac/renal issues with long term use. (3) I recommended that the patient follow-up with their medical physician to discuss any significant specific potential issues with long term medication use such as interactions with current medications or with exacerbation of underlying medical comorbidities. (4) The benefits and risks associated with use of injectable, oral or topical, prescription and over the counter anti-inflammatory medications were discussed with the patient. The patient voiced understanding of the risks including but not limited to bleeding, stroke, kidney dysfunction, heart disease, and were referred to the black box warning label for further information.  Consent:  Conservative treatment, nontreatment, nonsurgical intervention and surgical intervention treatment options have been reviewed with the patient.  The patient continues to be symptomatic and has failed conservative treatment, and elects to move forward with surgical intervention.  The patient is indicated for right shoulder arthroscopic rotator cuff repair, biceps tenotomy vs tenodesis, subacromial decompression, acromioplasty and all indicated procedures. As such the alternatives, benefits and risks, of the above procedure, including but not limited to bleeding, infection, neurovascular injury, loss of limb, loss of life,  DVT, PE, RSD, inability to return to previous level of activity, inability to return to previous level of employment, advancement of or to osteoarthritic changes, joint instability or motion loss, hardware failure or migration, (biceps pain cramping weakness or deformity,) failure to resolve all symptoms, failure to return to sports and need for further procedures, as well as specific risk of need for future joint arthroplasty were discussed with the patient and/or their legal guardian who agreed to move forward with surgical intervention.  They have reviewed and signed the consent form today after expressing understanding of the above documented conversation. The patient or their representative will contact my office as instructed on the preoperative instruction sheet they received today to schedule surgery in a timely manner as discussed. Over 25 minutes were spent on this encounter including time with the patient and over 15 minutes spent on counseling and coordination of care.  Joselyn MCNEIL attest that this documentation has been prepared under the direction and in the presence of Provider Dr. Nish Moody.   The documentation recorded by the scribe accurately reflects the service EWA Siddiqi PA-C personally performed and the decisions made by me. The patient was seen by me under the direct supervision of Dr. Nish Moody

## 2024-05-07 ENCOUNTER — APPOINTMENT (OUTPATIENT)
Dept: ORTHOPEDIC SURGERY | Facility: AMBULATORY SURGERY CENTER | Age: 59
End: 2024-05-07

## 2024-05-21 ENCOUNTER — APPOINTMENT (OUTPATIENT)
Dept: ORTHOPEDIC SURGERY | Facility: CLINIC | Age: 59
End: 2024-05-21

## 2024-12-10 ENCOUNTER — APPOINTMENT (OUTPATIENT)
Dept: OBGYN | Facility: CLINIC | Age: 59
End: 2024-12-10

## 2025-03-13 ENCOUNTER — APPOINTMENT (OUTPATIENT)
Dept: OBGYN | Facility: CLINIC | Age: 60
End: 2025-03-13
Payer: COMMERCIAL

## 2025-03-13 VITALS
DIASTOLIC BLOOD PRESSURE: 72 MMHG | HEIGHT: 64 IN | BODY MASS INDEX: 29.02 KG/M2 | WEIGHT: 170 LBS | SYSTOLIC BLOOD PRESSURE: 121 MMHG

## 2025-03-13 DIAGNOSIS — Z01.419 ENCOUNTER FOR GYNECOLOGICAL EXAMINATION (GENERAL) (ROUTINE) W/OUT ABNORMAL FINDINGS: ICD-10-CM

## 2025-03-13 DIAGNOSIS — R10.2 PELVIC AND PERINEAL PAIN: ICD-10-CM

## 2025-03-13 DIAGNOSIS — Z12.39 ENCOUNTER FOR OTHER SCREENING FOR MALIGNANT NEOPLASM OF BREAST: ICD-10-CM

## 2025-03-13 DIAGNOSIS — N95.1 MENOPAUSAL AND FEMALE CLIMACTERIC STATES: ICD-10-CM

## 2025-03-13 DIAGNOSIS — Z00.00 ENCOUNTER FOR GENERAL ADULT MEDICAL EXAMINATION W/OUT ABNORMAL FINDINGS: ICD-10-CM

## 2025-03-13 DIAGNOSIS — Z13.820 ENCOUNTER FOR SCREENING FOR OSTEOPOROSIS: ICD-10-CM

## 2025-03-13 PROCEDURE — 99214 OFFICE O/P EST MOD 30 MIN: CPT | Mod: 25

## 2025-03-13 PROCEDURE — 99386 PREV VISIT NEW AGE 40-64: CPT

## 2025-03-13 RX ORDER — PROGESTERONE 200 MG/1
200 CAPSULE ORAL
Qty: 28 | Refills: 6 | Status: ACTIVE | COMMUNITY
Start: 2025-03-13 | End: 1900-01-01

## 2025-03-13 RX ORDER — ESTRADIOL 0.1 MG/D
0.1 PATCH, EXTENDED RELEASE TRANSDERMAL
Qty: 8 | Refills: 6 | Status: ACTIVE | COMMUNITY
Start: 2025-03-13 | End: 1900-01-01

## 2025-03-17 LAB — HPV HIGH+LOW RISK DNA PNL CVX: NOT DETECTED

## 2025-03-20 LAB — CYTOLOGY CVX/VAG DOC THIN PREP: NORMAL

## 2025-06-04 ENCOUNTER — APPOINTMENT (OUTPATIENT)
Dept: OBGYN | Facility: CLINIC | Age: 60
End: 2025-06-04
Payer: COMMERCIAL

## 2025-06-04 VITALS
SYSTOLIC BLOOD PRESSURE: 109 MMHG | BODY MASS INDEX: 27.83 KG/M2 | HEIGHT: 64 IN | WEIGHT: 163 LBS | DIASTOLIC BLOOD PRESSURE: 73 MMHG

## 2025-06-04 DIAGNOSIS — R10.2 PELVIC AND PERINEAL PAIN: ICD-10-CM

## 2025-06-04 PROCEDURE — 99213 OFFICE O/P EST LOW 20 MIN: CPT

## 2025-06-04 RX ORDER — MISOPROSTOL 200 UG/1
200 TABLET ORAL
Qty: 2 | Refills: 0 | Status: ACTIVE | COMMUNITY
Start: 2025-06-04 | End: 1900-01-01

## 2025-08-18 ENCOUNTER — APPOINTMENT (OUTPATIENT)
Dept: OBGYN | Facility: CLINIC | Age: 60
End: 2025-08-18
Payer: COMMERCIAL

## 2025-08-18 VITALS
WEIGHT: 160 LBS | SYSTOLIC BLOOD PRESSURE: 106 MMHG | HEIGHT: 65 IN | BODY MASS INDEX: 26.66 KG/M2 | DIASTOLIC BLOOD PRESSURE: 70 MMHG

## 2025-08-18 DIAGNOSIS — R93.89 ABNORMAL FINDINGS ON DIAGNOSTIC IMAGING OF OTHER SPECIFIED BODY STRUCTURES: ICD-10-CM

## 2025-08-18 LAB
HCG UR QL: NEGATIVE
QUALITY CONTROL: YES

## 2025-08-18 PROCEDURE — 58558Z: CUSTOM

## 2025-08-20 ENCOUNTER — OFFICE (OUTPATIENT)
Dept: URBAN - METROPOLITAN AREA CLINIC 113 | Facility: CLINIC | Age: 60
Setting detail: OPHTHALMOLOGY
End: 2025-08-20
Payer: COMMERCIAL

## 2025-08-20 DIAGNOSIS — H01.005: ICD-10-CM

## 2025-08-20 DIAGNOSIS — H01.002: ICD-10-CM

## 2025-08-20 DIAGNOSIS — B30.9: ICD-10-CM

## 2025-08-20 DIAGNOSIS — H01.001: ICD-10-CM

## 2025-08-20 DIAGNOSIS — H01.004: ICD-10-CM

## 2025-08-20 DIAGNOSIS — B88.0: ICD-10-CM

## 2025-08-20 PROBLEM — H25.13 CATARACT SENILE NUCLEAR SCLEROSIS; BOTH EYES: Status: ACTIVE | Noted: 2025-08-20

## 2025-08-20 PROCEDURE — 99203 OFFICE O/P NEW LOW 30 MIN: CPT | Performed by: STUDENT IN AN ORGANIZED HEALTH CARE EDUCATION/TRAINING PROGRAM

## 2025-08-20 ASSESSMENT — KERATOMETRY
OD_AXISANGLE_DEGREES: 071
OS_K1POWER_DIOPTERS: 43.00
OD_K2POWER_DIOPTERS: 43.50
OS_AXISANGLE_DEGREES: 082
OD_K1POWER_DIOPTERS: 42.00
OS_K2POWER_DIOPTERS: 43.50

## 2025-08-20 ASSESSMENT — REFRACTION_AUTOREFRACTION
OS_AXIS: 103
OS_SPHERE: +0.25
OD_AXIS: 086
OS_CYLINDER: -1.25
OD_SPHERE: -0.25
OD_CYLINDER: -0.25

## 2025-08-20 ASSESSMENT — REFRACTION_CURRENTRX
OD_ADD: +1.25
OD_AXIS: +1.
OS_SPHERE: -0.25
OD_OVR_VA: 20/
OS_CYLINDER: -0.25
OD_SPHERE: -0.50
OS_ADD: +1.25
OS_AXIS: 050
OS_OVR_VA: 20/
OD_CYLINDER: -0.25

## 2025-08-20 ASSESSMENT — REFRACTION_MANIFEST
OS_SPHERE: -0.25
OD_VA2: 20/20
OD_VA1: 20/20
OD_ADD: +1.75
OD_SPHERE: -0.50
OS_ADD: +1.75
OS_AXIS: 70
OS_VA1: 20/20-
OS_CYLINDER: -0.50
OD_CYLINDER: -0.50
OD_AXIS: 110
OS_VA2: 20/20

## 2025-08-20 ASSESSMENT — CORNEAL SURGICAL SCARRING
OS_SCARRING: ANTERIOR
OD_SCARRING: ANTERIOR

## 2025-08-20 ASSESSMENT — CONFRONTATIONAL VISUAL FIELD TEST (CVF)
OS_FINDINGS: FULL
OD_FINDINGS: FULL

## 2025-08-20 ASSESSMENT — LID EXAM ASSESSMENTS
OD_COMMENTS: COLLERETTES
OS_COMMENTS: COLLERETTES
OD_BLEPHARITIS: RLL RUL 1+
OS_BLEPHARITIS: LLL LUL 1+

## 2025-08-20 ASSESSMENT — VISUAL ACUITY
OD_BCVA: 20/20
OS_BCVA: 20/30-